# Patient Record
Sex: FEMALE | Employment: FULL TIME | URBAN - METROPOLITAN AREA
[De-identification: names, ages, dates, MRNs, and addresses within clinical notes are randomized per-mention and may not be internally consistent; named-entity substitution may affect disease eponyms.]

---

## 2024-05-13 ENCOUNTER — OFFICE VISIT (OUTPATIENT)
Dept: DERMATOLOGY | Facility: CLINIC | Age: 34
End: 2024-05-13
Payer: COMMERCIAL

## 2024-05-13 DIAGNOSIS — L91.8 SKIN TAG: ICD-10-CM

## 2024-05-13 DIAGNOSIS — L81.0 POSTINFLAMMATORY HYPERPIGMENTATION: Primary | ICD-10-CM

## 2024-05-13 PROCEDURE — 99203 OFFICE O/P NEW LOW 30 MIN: CPT | Performed by: DERMATOLOGY

## 2024-05-13 PROCEDURE — 1036F TOBACCO NON-USER: CPT | Performed by: DERMATOLOGY

## 2024-05-13 ASSESSMENT — DERMATOLOGY PATIENT ASSESSMENT
DO YOU USE A TANNING BED: NO
DO YOU USE SUNSCREEN: OCCASIONALLY
DO YOU HAVE ANY NEW OR CHANGING LESIONS: YES
ARE YOU AN ORGAN TRANSPLANT RECIPIENT: NO
HAVE YOU HAD OR DO YOU HAVE A STAPH INFECTION: NO
HAVE YOU HAD OR DO YOU HAVE VASCULAR DISEASE: NO

## 2024-05-13 ASSESSMENT — DERMATOLOGY QUALITY OF LIFE (QOL) ASSESSMENT
RATE HOW BOTHERED YOU ARE BY SYMPTOMS OF YOUR SKIN PROBLEM (EG, ITCHING, STINGING BURNING, HURTING OR SKIN IRRITATION): 0 - NEVER BOTHERED
RATE HOW EMOTIONALLY BOTHERED YOU ARE BY YOUR SKIN PROBLEM (FOR EXAMPLE, WORRY, EMBARRASSMENT, FRUSTRATION): 0 - NEVER BOTHERED
WHAT SINGLE SKIN CONDITION LISTED BELOW IS THE PATIENT ANSWERING THE QUALITY-OF-LIFE ASSESSMENT QUESTIONS ABOUT: NONE OF THE ABOVE
RATE HOW BOTHERED YOU ARE BY EFFECTS OF YOUR SKIN PROBLEMS ON YOUR ACTIVITIES (EG, GOING OUT, ACCOMPLISHING WHAT YOU WANT, WORK ACTIVITIES OR YOUR RELATIONSHIPS WITH OTHERS): 0 - NEVER BOTHERED
ARE THERE EXCLUSIONS OR EXCEPTIONS FOR THE QUALITY OF LIFE ASSESSMENT: NO
DATE THE QUALITY-OF-LIFE ASSESSMENT WAS COMPLETED: 66973

## 2024-05-13 ASSESSMENT — ITCH NUMERIC RATING SCALE: HOW SEVERE IS YOUR ITCHING?: 0

## 2024-05-13 ASSESSMENT — PATIENT GLOBAL ASSESSMENT (PGA): PATIENT GLOBAL ASSESSMENT: PATIENT GLOBAL ASSESSMENT:  1 - CLEAR

## 2024-05-13 NOTE — PROGRESS NOTES
Subjective   Susan Salter is a 33 y.o. female who presents for the following: Suspicious Skin Lesion.    Skin Lesion  She describes it as a dark spot, that is located on her face.  It was first noticed several months ago. It has been causing no skin symptoms and is darkening. Previously this spot has been  treated with vitamin c serum, toner, facial wash and moisturizer that come from [place] .  Other spots that are concerning: right neck      Objective   Well appearing patient in no apparent distress; mood and affect are within normal limits.    A focused examination was performed including face and right neck. All findings within normal limits unless otherwise noted below.    Neck - Anterior  Fleshy, skin-colored sessile and pedunculated papule.     Head - Anterior (Face)  Patient has increased pigment in areas of previous acne scars; there may be some early pigment network c/w seborrheic keratosis but it is still in development and is not clear under dermoscopy. No atypia appreciated under dermoscopy.       Assessment/Plan   Skin tag  Neck - Anterior    The benign nature of the diagnosis was explained to patient. LN2 x 3 cycles performed per pt request to area; Risks, benefits, side effects, alternatives and options were discussed with patient and the patient voiced understanding. Wound care d/w pt and pt expressed understanding.       Postinflammatory hyperpigmentation  Head - Anterior (Face)    The nature of the diagnosis was explained to patient. Sun protection and use of at least SPF 30 discussed with patient. Pt instructed to reapply every 2 hours. Also recommended using 20% vitamin C as pt is not sure what strength she used previously; can consider retinoid if that doesn't work but pt has been very sensitive to them in the past and wants to avoid for now. Can also consider hydroquinone but warned pt that would also be not covered by insurance. Counseled patient that it make take up to 3 months to see results.  Risks, benefits, side effects, alternatives and options were discussed with patient and the patient voiced understanding. Pt agrees with plan as above and will call if wanting to try retinoid/hydroquinone.         Follow up as needed.

## 2024-06-26 ENCOUNTER — APPOINTMENT (OUTPATIENT)
Dept: DERMATOLOGY | Facility: CLINIC | Age: 34
End: 2024-06-26
Payer: COMMERCIAL

## 2024-09-30 ENCOUNTER — APPOINTMENT (OUTPATIENT)
Dept: PRIMARY CARE | Facility: CLINIC | Age: 34
End: 2024-09-30
Payer: COMMERCIAL

## 2024-09-30 VITALS
HEART RATE: 63 BPM | BODY MASS INDEX: 26.4 KG/M2 | TEMPERATURE: 98.2 F | SYSTOLIC BLOOD PRESSURE: 110 MMHG | WEIGHT: 149 LBS | DIASTOLIC BLOOD PRESSURE: 72 MMHG | OXYGEN SATURATION: 98 % | HEIGHT: 63 IN | RESPIRATION RATE: 16 BRPM

## 2024-09-30 DIAGNOSIS — Z80.3 FAMILY HISTORY OF BREAST CANCER IN MOTHER: ICD-10-CM

## 2024-09-30 DIAGNOSIS — Z12.4 CERVICAL CANCER SCREENING: ICD-10-CM

## 2024-09-30 DIAGNOSIS — Z00.00 HEALTHCARE MAINTENANCE: Primary | ICD-10-CM

## 2024-09-30 LAB
POC APPEARANCE, URINE: CLEAR
POC BILIRUBIN, URINE: NEGATIVE
POC BLOOD, URINE: NEGATIVE
POC COLOR, URINE: YELLOW
POC GLUCOSE, URINE: NEGATIVE MG/DL
POC KETONES, URINE: NEGATIVE MG/DL
POC LEUKOCYTES, URINE: NEGATIVE
POC NITRITE,URINE: NEGATIVE
POC PH, URINE: 7.5 PH
POC PROTEIN, URINE: NEGATIVE MG/DL
POC SPECIFIC GRAVITY, URINE: 1.01
POC UROBILINOGEN, URINE: 0.2 EU/DL

## 2024-09-30 PROCEDURE — 99385 PREV VISIT NEW AGE 18-39: CPT | Performed by: FAMILY MEDICINE

## 2024-09-30 PROCEDURE — 1036F TOBACCO NON-USER: CPT | Performed by: FAMILY MEDICINE

## 2024-09-30 PROCEDURE — 3008F BODY MASS INDEX DOCD: CPT | Performed by: FAMILY MEDICINE

## 2024-09-30 PROCEDURE — 87624 HPV HI-RISK TYP POOLED RSLT: CPT

## 2024-09-30 PROCEDURE — 81002 URINALYSIS NONAUTO W/O SCOPE: CPT | Performed by: FAMILY MEDICINE

## 2024-09-30 ASSESSMENT — PATIENT HEALTH QUESTIONNAIRE - PHQ9
SUM OF ALL RESPONSES TO PHQ9 QUESTIONS 1 AND 2: 0
2. FEELING DOWN, DEPRESSED OR HOPELESS: NOT AT ALL
1. LITTLE INTEREST OR PLEASURE IN DOING THINGS: NOT AT ALL

## 2024-09-30 NOTE — PATIENT INSTRUCTIONS
Welcome to our practice!     Today we performed your Annual Physical Exam.  Your preventative health care, labs and vaccinations have been reviewed and are up to date.      I have referred you to Breast surgery for consult and ordered a mammogram due to your family history.      Follow up in 1 year for your Complete Physical Exam    For your allergies I recommend flonase otc

## 2024-09-30 NOTE — PROGRESS NOTES
"No current outpatient medications on file.     No current facility-administered medications for this visit.   Subjective   Patient ID: Susan Salter is a 34 y.o. female who presents for Annual Exam ([facility] ).    Dentist and Eye Doctor appointments: UTD  Immunizations: defers flu   Diet: Mediterranean  Exercise: weight lifting, running, yoga and pilates  Supplements: probiotic  Menstrual Cycles: Regular  Sexually Active: Not currently  Pregnancy History:G0  Cancer Screening:    Cervical: due   Breast:DUE   Colon: N/A   Skin: sees derm, wears sunscreen   DEXA: N/A        HPI     Review of Systems    Objective   /72   Pulse 63   Temp 36.8 °C (98.2 °F)   Resp 16   Ht 1.6 m (5' 3\")   Wt 67.6 kg (149 lb)   LMP 09/03/2024 Comment: per pt last pap maybe 5 years ago  SpO2 98%   BMI 26.39 kg/m²     Physical Exam  Constitutional:       General: She is not in acute distress.     Appearance: She is well-developed. She is not diaphoretic.   HENT:      Head: Normocephalic and atraumatic.      Right Ear: Tympanic membrane normal.      Left Ear: Tympanic membrane normal.      Nose: Nose normal.      Mouth/Throat:      Mouth: Mucous membranes are moist.   Eyes:      General: No scleral icterus.     Pupils: Pupils are equal, round, and reactive to light.   Neck:      Thyroid: No thyromegaly.      Vascular: No JVD.   Cardiovascular:      Rate and Rhythm: Normal rate and regular rhythm.      Heart sounds: Normal heart sounds. No murmur heard.     No friction rub. No gallop.   Pulmonary:      Effort: Pulmonary effort is normal. No respiratory distress.      Breath sounds: Normal breath sounds. No wheezing or rales.   Chest:      Chest wall: No tenderness.   Breasts:     Right: Normal.      Left: Normal.   Abdominal:      General: Bowel sounds are normal. There is no distension.      Palpations: Abdomen is soft. There is no mass.      Tenderness: There is no abdominal tenderness. There is no rebound.   Genitourinary:    "  General: Normal vulva.      Vagina: Normal.      Cervix: Normal.      Uterus: Normal.       Adnexa: Right adnexa normal and left adnexa normal.   Musculoskeletal:         General: Normal range of motion.      Cervical back: Normal range of motion and neck supple.   Lymphadenopathy:      Cervical: No cervical adenopathy.   Skin:     General: Skin is warm and dry.   Neurological:      General: No focal deficit present.      Mental Status: She is alert and oriented to person, place, and time.      Deep Tendon Reflexes: Reflexes normal.   Psychiatric:         Mood and Affect: Mood normal.         Behavior: Behavior normal.         Assessment/Plan   Diagnoses and all orders for this visit:  Healthcare maintenance  -     POCT UA (nonautomated) manually resulted  -     CBC; Future  -     Comprehensive Metabolic Panel; Future  -     Lipid Panel; Future  -     TSH with reflex to Free T4 if abnormal; Future  -     Vitamin D 25 hydroxy; Future  Cervical cancer screening  -     THINPREP PAP TEST  Family history of breast cancer in mother  -     BI mammo bilateral screening tomosynthesis; Future  -     Referral to Breast Surgery; Future

## 2024-10-11 LAB
CYTOLOGY CMNT CVX/VAG CYTO-IMP: NORMAL
HPV HR 12 DNA GENITAL QL NAA+PROBE: POSITIVE
HPV HR GENOTYPES PNL CVX NAA+PROBE: POSITIVE
HPV16 DNA SPEC QL NAA+PROBE: NEGATIVE
HPV18 DNA SPEC QL NAA+PROBE: NEGATIVE
LAB AP HPV GENOTYPE QUESTION: YES
LAB AP HPV HR: NORMAL
LABORATORY COMMENT REPORT: NORMAL
LMP START DATE: NORMAL
PATH REPORT.TOTAL CANCER: NORMAL
RESIDENT REVIEW: NORMAL

## 2024-11-08 ENCOUNTER — APPOINTMENT (OUTPATIENT)
Dept: RADIOLOGY | Facility: CLINIC | Age: 34
End: 2024-11-08
Payer: COMMERCIAL

## 2024-11-15 ENCOUNTER — APPOINTMENT (OUTPATIENT)
Dept: SURGICAL ONCOLOGY | Facility: HOSPITAL | Age: 34
End: 2024-11-15
Payer: COMMERCIAL

## 2025-02-21 ENCOUNTER — OFFICE VISIT (OUTPATIENT)
Facility: CLINIC | Age: 35
End: 2025-02-21
Payer: COMMERCIAL

## 2025-02-21 VITALS
RESPIRATION RATE: 16 BRPM | SYSTOLIC BLOOD PRESSURE: 108 MMHG | TEMPERATURE: 98.4 F | HEART RATE: 70 BPM | DIASTOLIC BLOOD PRESSURE: 74 MMHG | WEIGHT: 154 LBS | BODY MASS INDEX: 27.28 KG/M2 | OXYGEN SATURATION: 98 %

## 2025-02-21 DIAGNOSIS — J06.9 UPPER RESPIRATORY TRACT INFECTION, UNSPECIFIED TYPE: Primary | ICD-10-CM

## 2025-02-21 PROCEDURE — 99213 OFFICE O/P EST LOW 20 MIN: CPT | Performed by: FAMILY MEDICINE

## 2025-02-21 NOTE — PATIENT INSTRUCTIONS
Today we treated you for a viral respiratory infection.  RSV/COVID/Flu tests are all ordered today  Recommend increase fluids and rest.   Follow up if no improvement or if symptoms worsen.   Try otc medication such as mucinex, claritin or pseudofed for symptoms.  Do not use for longer than 5 days and if symptoms persist please call the office or Return to Clinic to be seen.

## 2025-02-21 NOTE — PROGRESS NOTES
"No current outpatient medications on file.     No current facility-administered medications for this visit.   Subjective   Patient ID: Susan Salter is a 34 y.o. female who presents for Nasal Congestion (Onset 3 days ago - cough with yellow phlegm. Fever on day one. Negative home COVID test).    Her \"team\" all got  pneumonia and so she is here today to be checked.  She started to feel sick about 3 days ago.  She started coughing up some yellow mucous.  She tried some mucinex to try to clear it out.  She tried the netipot as well.  She feels her throat is swollen.  Her temperature on the first day was elevated but she doesn't know how high.  She has some ringing in the ears.  She didn't have a flu shot this year.  She denies body aches/pains.  She does feel a litle SOB.  No history of asthma.           Review of Systems    Objective   /74   Pulse 70   Temp 36.9 °C (98.4 °F)   Resp 16   Wt 69.9 kg (154 lb)   LMP 01/26/2025   SpO2 98%   BMI 27.28 kg/m²     Physical Exam  Constitutional:       Appearance: Normal appearance.   HENT:      Head: Normocephalic and atraumatic.      Right Ear: Tympanic membrane normal.      Left Ear: Tympanic membrane normal.      Nose: Congestion and rhinorrhea present.      Mouth/Throat:      Mouth: Mucous membranes are moist.   Cardiovascular:      Rate and Rhythm: Normal rate and regular rhythm.      Pulses: Normal pulses.   Pulmonary:      Effort: Pulmonary effort is normal. No respiratory distress.      Breath sounds: Normal breath sounds. No stridor. No wheezing, rhonchi or rales.   Musculoskeletal:      Cervical back: Normal range of motion and neck supple.   Skin:     General: Skin is warm and dry.   Neurological:      Mental Status: She is alert.   Psychiatric:         Mood and Affect: Mood normal.         Assessment/Plan   Diagnoses and all orders for this visit:  Upper respiratory tract infection, unspecified type  -     RSV PCR; Future  -     Sars-CoV-2 and Influenza " A/B PCR; Future

## 2025-02-22 LAB
FLUAV RNA RESP QL NAA+PROBE: NOT DETECTED
FLUBV RNA RESP QL NAA+PROBE: NOT DETECTED
RSV RNA RESP QL NAA+PROBE: NOT DETECTED
SARS-COV-2 RNA RESP QL NAA+PROBE: NOT DETECTED

## 2025-04-15 NOTE — PROGRESS NOTES
Subjective     Susan Salter is a 34 y.o. female who presents for the following: Skin Check (FBSE. No personal or family hx of skin ca. Area of concern to buttocks. Pt states the dark spots popped up about 6 months ago along with some acne. Pt has used BPO wash which cleared the acne but dark spots are still present ).     Skin Cancer Screening  She has a history of heavy sun exposure. She is in the sun frequently. She uses sunscreen frequently. She reports no skin symptoms. Her moles are darkening.    Spots that concern her: buttocks     Review of Systems:  No other skin or systemic complaints other than what is documented elsewhere in the note.    The following portions of the chart were reviewed this encounter and updated as appropriate:       Skin Cancer History  No skin cancer on file.    Specialty Problems    None    Past Medical History:  Susan Salter  has a past medical history of Personal history of other specified conditions (01/23/2017).    Past Surgical History:  Susan Salter  has no past surgical history on file.    Family History:  Patient family history includes Breast cancer in her mother; Heart attack in her father.       Objective   Well appearing patient in no apparent distress; mood and affect are within normal limits.    A full examination was performed including scalp, head, eyes, ears, nose, lips, neck, chest, axillae, abdomen, back, buttocks, bilateral upper extremities, bilateral lower extremities, hands, feet, fingers, toes, fingernails, and toenails. All findings within normal limits unless otherwise noted below.    Assessment/Plan   Skin Exam  1. FOLLICULITIS  Left Buttock, Right Buttock  Patient has 2 areas of active irritated hair follicles on the L buttock and 2-3 areas of post-inflammatory hyperpigmentation on the b/l buttocks  The nature of the diagnosis was explained to patient. Will plan to treat with 10% BPO (will make sure she has the highest strength) as well as clindamycin gel BID x 2  weeks to any active areas. Counseled patient to speak with med spa regaring any procedures and discuss with them what their outcomes are for patients of color/darker espinal types as she is prone to PIH and this may occur with certain procedures. Pt can send me what procedures or tools they are recommending so I can help her research as well. Counseled patient PIH may take months to resolve and most important thing is to prevent new flares. Risks, benefits, side effects, alternatives and options were discussed with patient and the patient voiced understanding. Pt agrees with plan as above.     Related Medications  benzoyl peroxide (Benzac AC) 10 % external wash  Apply 1 Application topically once daily.  clindamycin (Cleocin T) 1 % gel  Apply topically once daily.  2. POST-INFLAMMATORY HYPERPIGMENTATION      Related Medications  hydroquinone 4 % cream  Apply topically 2 times a day. For 3 months, then take a break  3. POSTINFLAMMATORY HYPERPIGMENTATION  Head - Anterior (Face)  Patient has increased pigment in areas of previous acne scars; there may be some early pigment network c/w seborrheic keratosis but it is still in development and is not clear under dermoscopy. No atypia appreciated under dermoscopy.   The nature of the diagnosis was explained to patient. Sun protection and use of at least SPF 30 discussed with patient. Pt instructed to reapply every 2 hours. Since Vitamin C did not work as well, will try hydroquinone but warned pt that would also be not covered by insurance; goodrx coupon sent.  Counseled patient that it make take up to 3 months to see results and she should take a break after 3 months to avoid paradoxical hyperpigmentation. Risks, benefits, side effects, alternatives and options were discussed with patient and the patient voiced understanding. Pt agrees with plan as above.       Follow up 3-4 months or sooner as needed

## 2025-04-16 ENCOUNTER — APPOINTMENT (OUTPATIENT)
Dept: DERMATOLOGY | Facility: CLINIC | Age: 35
End: 2025-04-16
Payer: COMMERCIAL

## 2025-04-16 DIAGNOSIS — L81.0 POSTINFLAMMATORY HYPERPIGMENTATION: ICD-10-CM

## 2025-04-16 DIAGNOSIS — L81.0 POST-INFLAMMATORY HYPERPIGMENTATION: ICD-10-CM

## 2025-04-16 DIAGNOSIS — L73.9 FOLLICULITIS: Primary | ICD-10-CM

## 2025-04-16 PROCEDURE — 99214 OFFICE O/P EST MOD 30 MIN: CPT | Performed by: DERMATOLOGY

## 2025-04-16 RX ORDER — BENZOYL PEROXIDE 100 MG/ML
1 LIQUID TOPICAL DAILY
Qty: 237 G | Refills: 11 | Status: SHIPPED | OUTPATIENT
Start: 2025-04-16 | End: 2026-04-16

## 2025-04-16 RX ORDER — CLINDAMYCIN PHOSPHATE 10 MG/G
GEL TOPICAL DAILY
Qty: 60 G | Refills: 11 | Status: SHIPPED | OUTPATIENT
Start: 2025-04-16 | End: 2026-04-16

## 2025-04-16 RX ORDER — HYDROQUINONE 40 MG/G
CREAM TOPICAL 2 TIMES DAILY
Qty: 28.35 G | Refills: 3 | Status: SHIPPED | OUTPATIENT
Start: 2025-04-16 | End: 2026-04-16

## 2025-04-16 NOTE — Clinical Note
The nature of the diagnosis was explained to patient. Will plan to treat with 10% BPO (will make sure she has the highest strength) as well as clindamycin gel BID x 2 weeks to any active areas. Counseled patient to speak with med spa reg[facility] any procedures and discuss with them what their outcomes are for patients of color/darker espinal types as she is prone to PIH and this may occur with certain procedures. Pt can send me what procedures or tools they are recommending so I can help her research as well. Counseled patient PIH may take months to resolve and most important thing is to prevent new flares. Risks, benefits, side effects, alternatives and options were discussed with patient and the patient voiced understanding. Pt agrees with plan as above.

## 2025-04-16 NOTE — Clinical Note
Patient has 2 areas of active irritated hair follicles on the L buttock and 2-3 areas of post-inflammatory hyperpigmentation on the b/l buttocks

## 2025-04-16 NOTE — Clinical Note
The nature of the diagnosis was explained to patient. Sun protection and use of at least SPF 30 discussed with patient. Pt instructed to reapply every 2 hours. Since Vitamin C did not work as well, will try hydroquinone but warned pt that would also be not covered by insurance; goodrx coupon sent.  Counseled patient that it make take up to 3 months to see results and she should take a break after 3 months to avoid paradoxical hyperpigmentation. Risks, benefits, side effects, alternatives and options were discussed with patient and the patient voiced understanding. Pt agrees with plan as above.

## 2025-04-16 NOTE — Clinical Note
Patient has increased pigment in areas of previous acne scars; there may be some early pigment network c/w seborrheic keratosis but it is still in development and is not clear under dermoscopy. No atypia appreciated under dermoscopy.

## 2025-08-04 ENCOUNTER — APPOINTMENT (OUTPATIENT)
Dept: DERMATOLOGY | Facility: CLINIC | Age: 35
End: 2025-08-04
Payer: COMMERCIAL